# Patient Record
Sex: MALE | Race: WHITE | NOT HISPANIC OR LATINO | Employment: FULL TIME | URBAN - METROPOLITAN AREA
[De-identification: names, ages, dates, MRNs, and addresses within clinical notes are randomized per-mention and may not be internally consistent; named-entity substitution may affect disease eponyms.]

---

## 2020-08-25 ENCOUNTER — HOSPITAL ENCOUNTER (OUTPATIENT)
Dept: OTHER | Facility: HOSPITAL | Age: 60
Discharge: HOME | End: 2020-08-25
Attending: EMERGENCY MEDICINE

## 2021-01-08 ENCOUNTER — OFFICE VISIT CONVERTED (OUTPATIENT)
Dept: SURGERY | Facility: CLINIC | Age: 61
End: 2021-01-08
Attending: NURSE PRACTITIONER

## 2021-01-08 ENCOUNTER — CONVERSION ENCOUNTER (OUTPATIENT)
Dept: SURGERY | Facility: CLINIC | Age: 61
End: 2021-01-08

## 2021-04-02 ENCOUNTER — HOSPITAL ENCOUNTER (OUTPATIENT)
Dept: GASTROENTEROLOGY | Facility: HOSPITAL | Age: 61
Setting detail: HOSPITAL OUTPATIENT SURGERY
Discharge: HOME | End: 2021-04-02
Attending: SURGERY

## 2021-04-26 ENCOUNTER — CONVERSION ENCOUNTER (OUTPATIENT)
Dept: SURGERY | Facility: CLINIC | Age: 61
End: 2021-04-26

## 2021-04-26 ENCOUNTER — OFFICE VISIT CONVERTED (OUTPATIENT)
Dept: SURGERY | Facility: CLINIC | Age: 61
End: 2021-04-26
Attending: NURSE PRACTITIONER

## 2021-05-10 NOTE — H&P
History and Physical      Patient Name: Basilio Valencia   Patient ID: [id number]   Sex: Male   YOB: 1960    Primary Care Provider: Dexter Tapia MD    Visit Date: January 8, 2021    Provider: CLAY Darling   Location: [facility]   Location Address: [address] [place] [address]  [id number]   Location Phone: [phone]          Chief Complaint  · Requesting colonoscopy  · Age 50 or over  · Here today for a pre-surgical colon screening visit  · Personal History of Polyps      History Of Present Illness  The patient is a 60 year old /White male presenting to the Surgical Specialist office on a referral from Tj Darling MD.   Basilio Valencia needs to have a screening colonoscopy.   Patient states that they have had a colonoscopy. 5 years ago   Patient currently complains of: no complaints   Patient Does not have family history of colon cancer.      Patient presents today on referral from Dr. Tj Darling.  Patient presents today without complaints for screening colonoscopy.  Denies any abdominal pain, diarrhea, or rectal bleeding.  Denies family history of colorectal cancer.  Admits to history of colonic polyps.    12/15: Colonoscopy (Phong): Splenic flexure - hyperplastic; diverticulosis.     10/13: Colonoscopy (Phong): Cecum - tubular adenoma.     10/12: Colonoscopy (Phong): @10cm - Tubulovillous adenoma; Hepatic flexure - tubular/Tubulovillous adenoma; Cecum - tubular adenoma; Distal transverse - mixed adenomatous/hyperplastic; Splenic flexure - Tubulovillous adenoma.       Past Medical History  Disease Name Date Onset Notes   Arthritis --  --    Arthritis --  --    Cancer --  --    Osteoarthritis of right shoulder 09/11/2015 --    Personal history of colonic polyps 11/03/2015 --    Prostate Disorder --  --    Right Shoulder Osteoarthritis 08/26/2015 --    Screening for colon cancer 11/03/2015 --          Past Surgical History  Procedure Name Date Notes   Colonoscopy  --  --    Tonsillectomy --  --          Medication List  Name Date Started Instructions   Aleve oral  --          Allergy List  Allergen Name Date Reaction Notes   NO KNOWN DRUG ALLERGIES --  --  --        Allergies Reconciled  Family Medical History  Disease Name Relative/Age Notes   Cancer, Unspecified Mother/   Mother   Renal Cancer Mother/70s   Mother/70s   Family history of certain chronic disabling diseases; arthritis Mother/   Mother         Social History  Finding Status Start/Stop Quantity Notes   Alcohol Use Current some day --/-- --  drinks weekly, less than 1 drink per day, has been drinking for 31 or more years   lives with spouse --  --/-- --  --    . --  --/-- --  --    Recreational Drug Use Never --/-- --  no   Tobacco Former --/-- --  former smoker   Working --  --/-- --  --          Review of Systems  · Constitutional  o Denies  o : fever, chills  · Eyes  o Denies  o : yellowish discoloration of eyes  · HENT  o Denies  o : difficulty swallowing  · Cardiovascular  o Denies  o : chest pain, chest pain on exertion  · Respiratory  o Denies  o : shortness of breath  · Gastrointestinal  o Denies  o : nausea, vomiting, diarrhea, constipation  · Genitourinary  o Denies  o : abnormal color of urine  · Integument  o Denies  o : rash  · Neurologic  o Denies  o : tingling or numbness  · Musculoskeletal  o Denies  o : joint pain  · Endocrine  o Denies  o : weight gain, weight loss      Vitals  Date Time BP Position Site L\R Cuff Size HR RR TEMP (F) WT  HT  BMI kg/m2 BSA m2 O2 Sat FR L/min FiO2 HC       01/08/2021 10:03 AM       16  240lbs 0oz 6'   32.55 2.35             Physical Examination  · Constitutional  o Appearance  o : well developed, well-nourished, patient in no apparent distress  · Head and Face  o Head  o :   § Inspection  § : atraumatic, normocephalic  o Face  o :   § Inspection  § : no facial lesions  · Eyes  o Conjunctivae  o : conjunctivae normal  o Sclerae  o : sclerae  white  · Neck  o Inspection/Palpation  o : normal appearance, no masses or tenderness, trachea midline  · Respiratory  o Respiratory Effort  o : breathing unlabored  · Skin and Subcutaneous Tissue  o General Inspection  o : no lesions present, no areas of discoloration, skin turgor normal, texture normal  · Neurologic  o Mental Status Examination  o :   § Orientation  § : grossly oriented to person, place and time  § Attention  § : attention normal, concentration abilities normal  § Fund of Knowledge  § : fund of knowledge within normal limits, patient aware of current events  o Gait and Station  o : normal gait, able to stand without difficulty  · Psychiatric  o Judgement and Insight  o : judgment and insight intact  o Mood and Affect  o : mood normal, affect appropriate          Assessment  · Personal history of colonic polyps     V12.72/Z86.010  · Screening for colon cancer     V76.51/Z12.11    Problems Reconciled  Plan  · Orders  o Consent for Colonoscopy Screening-Possible risk/complications, benefits, and alternatives to surgical or invasive procedure have been explained to patient and/or legal guardian. -Patient has been evaluated and can tolerate anesthesia and/or sedation. Risks, benefits, and alternatives to anesthesia and sedation have been explained to patient and/or legal guardian. () - V76.51/Z12.11, V12.72/Z86.010 - 04/02/2021  · Medications  o Medications have been Reconciled  o Transition of Care or Provider Policy  · Instructions  o Surgical Facility: [facility]  o Handouts Provided Pre-Procedure Instructions including date, time, and location of procedure.   o PLAN: Proceeed with colonoscopy. Patient understands risks/benefits and is willing to proceed.   o ***Surgical Orders***  o RISK AND BENEFITS:  o Given these options, the patient has verbally expressed an understanding of the risks of the surgery and finds these risks acceptable. Will proceed with surgery as soon as  possible.  o O.R. PREP: Per protocol   o IV: Per Anesthesia  o Please sign permit for: Colonoscopy with possible biopsies by Dr. Isidro.  o The above History and Physical Examination has been completed within 30 days of admission.  o ***Patient Status***  o Outpatient  o Follow up in the in the office post procedure.  o Electronically Identified Patient Education Materials Provided Electronically  · Disposition  o Call or Return if symptoms worsen or persist.            Electronically Signed by: CLAY Darling -Author on January 8, 2021 10:52:29 AM

## 2021-05-11 NOTE — H&P
History and Physical      Patient Name: Basilio Valencia   Patient ID: [id number]   Sex: Male   YOB: 1960    Primary Care Provider: Dexter Tapia MD    Visit Date: April 26, 2021    Provider: CLAY Darling   Location: [facility]   Location Address: [address] [place] [address]  [id number]   Location Phone: [phone]          Chief Complaint  · Follow Up Colonoscopy      History Of Present Illness  Basilio Valencia is a 60 year old /White male who is here to follow up colonoscopy.      Patient presents today for follow-up visit after undergoing a colonoscopy on 4/2/2021 performed by Dr. Pedro Isidro.  Patient was with diverticulosis of the sigmoid colon and a tubular adenoma of the ascending colon per colonoscopy/pathology.  Patient denies any postoperative complications.       Past Medical History  Disease Name Date Onset Notes   Arthritis --  --    Arthritis --  --    Cancer --  --    Osteoarthritis of right shoulder 09/11/2015 --    Personal history of colonic polyps 11/03/2015 --    Prostate Disorder --  --    Right Shoulder Osteoarthritis 08/26/2015 --    Screening for colon cancer 11/03/2015 --          Past Surgical History  Procedure Name Date Notes   Colonoscopy --  --    Tonsillectomy --  --          Medication List  Name Date Started Instructions   Aleve oral  --          Allergy List  Allergen Name Date Reaction Notes   NO KNOWN DRUG ALLERGIES --  --  --        Allergies Reconciled  Family Medical History  Disease Name Relative/Age Notes   Cancer, Unspecified Mother/   Mother   Renal Cancer Mother/70s   Mother/70s   Family history of certain chronic disabling diseases; arthritis Mother/   Mother         Social History  Finding Status Start/Stop Quantity Notes   Alcohol Use Current some day --/-- --  drinks weekly, less than 1 drink per day, has been drinking for 31 or more years   lives with spouse --  --/-- --  --    . --  --/-- --  --     Recreational Drug Use Never --/-- --  no   Tobacco Former --/-- --  former smoker   Working --  --/-- --  --          Review of Systems  · Constitutional  o Denies  o : chills, fever  · Eyes  o Denies  o : yellowish discoloration of eyes  · HENT  o Denies  o : difficulty swallowing  · Cardiovascular  o Denies  o : chest pain on exertion  · Respiratory  o Denies  o : shortness of breath  · Gastrointestinal  o Denies  o : nausea, vomiting, diarrhea, constipation  · Genitourinary  o Denies  o : abnormal color of urine  · Integument  o Denies  o : rash  · Neurologic  o Denies  o : tingling or numbness  · Musculoskeletal  o Denies  o : joint pain  · Endocrine  o Denies  o : weight gain, weight loss      Vitals  Date Time BP Position Site L\R Cuff Size HR RR TEMP (F) WT  HT  BMI kg/m2 BSA m2 O2 Sat FR L/min FiO2 HC       04/26/2021 01:10 PM       14  235lbs 8oz 6'   31.94 2.33             Physical Examination  · Constitutional  o Appearance  o : well developed, well-nourished, patient in no apparent distress  · Head and Face  o Head  o :   § Inspection  § : atraumatic, normocephalic  o Face  o :   § Inspection  § : no facial lesions  · Eyes  o Conjunctivae  o : conjunctivae normal  o Sclerae  o : sclerae white  · Neck  o Inspection/Palpation  o : normal appearance, no masses or tenderness, trachea midline  · Respiratory  o Respiratory Effort  o : breathing unlabored  · Skin and Subcutaneous Tissue  o General Inspection  o : no lesions present, no areas of discoloration, skin turgor normal, texture normal  · Neurologic  o Mental Status Examination  o :   § Orientation  § : grossly oriented to person, place and time  § Attention  § : attention normal, concentration abilities normal  § Fund of Knowledge  § : fund of knowledge within normal limits, patient aware of current events  o Gait and Station  o : normal gait, able to stand without difficulty  · Psychiatric  o Judgement and Insight  o : judgment and insight  intact  o Mood and Affect  o : mood normal, affect appropriate              Assessment  · Diverticulosis     562.10/K57.90  · Tubular adenoma     229.9/D36.9  · S/P colonoscopy with polypectomy     V45.89/Z98.890    Problems Reconciled  Plan  · Medications  o Medications have been Reconciled  o Transition of Care or Provider Policy  · Instructions  o Per the AGA guidelines of 2012 patient is to follow up for colonoscopy surveillance  o Rescreen: In 5 years. Follow-up in the interim as needed  o increase fiber to 35 g/day  o Electronically Identified Patient Education Materials Provided Electronically  · Disposition  o EMR dragon/transcription disclaimer: Much of this encounter note is an electronic transcription/translation of spoken language to printed text. Electronic translation of spoken language may permit erroneous, or at times nonsensical words or phrases to be inadvertently trasncribed; although I have reviewed the note for such errors, some may still exist.            Electronically Signed by: CALY Darling -Author on April 26, 2021 02:00:43 PM

## 2021-05-14 VITALS — HEIGHT: 72 IN | BODY MASS INDEX: 32.51 KG/M2 | WEIGHT: 240 LBS | RESPIRATION RATE: 16 BRPM

## 2021-05-14 VITALS — BODY MASS INDEX: 31.9 KG/M2 | HEIGHT: 72 IN | RESPIRATION RATE: 14 BRPM | WEIGHT: 235.5 LBS

## 2022-11-14 ENCOUNTER — PREP FOR SURGERY (OUTPATIENT)
Dept: OTHER | Facility: HOSPITAL | Age: 62
End: 2022-11-14

## 2022-11-14 ENCOUNTER — OFFICE VISIT (OUTPATIENT)
Dept: ORTHOPEDIC SURGERY | Facility: CLINIC | Age: 62
End: 2022-11-14

## 2022-11-14 VITALS — WEIGHT: 235 LBS | HEIGHT: 72 IN | BODY MASS INDEX: 31.83 KG/M2

## 2022-11-14 DIAGNOSIS — S83.241A ACUTE MEDIAL MENISCUS TEAR OF RIGHT KNEE, INITIAL ENCOUNTER: Primary | ICD-10-CM

## 2022-11-14 PROBLEM — M23.321 MEDIAL MENISCUS, POSTERIOR HORN DERANGEMENT, RIGHT: Status: ACTIVE | Noted: 2022-11-14

## 2022-11-14 PROCEDURE — 99203 OFFICE O/P NEW LOW 30 MIN: CPT | Performed by: ORTHOPAEDIC SURGERY

## 2022-11-14 RX ORDER — CEFAZOLIN SODIUM 2 G/100ML
2 INJECTION, SOLUTION INTRAVENOUS ONCE
Status: CANCELLED | OUTPATIENT
Start: 2022-11-14 | End: 2022-11-14

## 2022-11-14 RX ORDER — CEFAZOLIN SODIUM IN 0.9 % NACL 3 G/100 ML
3 INTRAVENOUS SOLUTION, PIGGYBACK (ML) INTRAVENOUS ONCE
Status: CANCELLED | OUTPATIENT
Start: 2022-11-14 | End: 2022-11-14

## 2022-11-14 RX ORDER — FAMCICLOVIR 500 MG/1
500 TABLET ORAL 2 TIMES DAILY
COMMUNITY
Start: 2022-09-19 | End: 2022-11-29

## 2022-11-14 RX ORDER — TAMSULOSIN HYDROCHLORIDE 0.4 MG/1
CAPSULE ORAL
COMMUNITY
Start: 2022-10-19

## 2022-11-14 RX ORDER — DICLOFENAC SODIUM 75 MG/1
75 TABLET, DELAYED RELEASE ORAL EVERY 12 HOURS
COMMUNITY
Start: 2022-10-19 | End: 2022-11-29

## 2022-11-14 NOTE — PROGRESS NOTES
"Chief Complaint  Initial Evaluation of the Right Knee     Subjective      Basilio Valencia presents to [facility] for initial evaluation of the right knee.  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  Patient has had no injury noted. Patient having pain getting up and down and in and out of bed.     No Known Allergies     Social History     Socioeconomic History   • Marital status:    Tobacco Use   • Smoking status: Former     Packs/day: 1.00     Years: 15.00     Pack years: 15.00     Types: Cigarettes     Start date: 1977     Quit date: 1993     Years since quittin.8   • Smokeless tobacco: Current     Types: Chew   Substance and Sexual Activity   • Alcohol use: Yes     Alcohol/week: 6.0 standard drinks     Types: 6 Cans of beer per week   • Drug use: Never   • Sexual activity: Yes     Partners: Female     Birth control/protection: Vasectomy        Review of Systems     Objective   Vital Signs:   Ht 182.9 cm (72\")   Wt 107 kg (235 lb)   BMI 31.87 kg/m²       Physical Exam  Constitutional:       Appearance: Normal appearance. Patient is well-developed and normal weight.   HENT:      Head: Normocephalic.      Right Ear: Hearing and external ear normal.      Left Ear: Hearing and external ear normal.      Nose: Nose normal.   Eyes:      Conjunctiva/sclera: Conjunctivae normal.   Cardiovascular:      Rate and Rhythm: Normal rate.   Pulmonary:      Effort: Pulmonary effort is normal.      Breath sounds: No wheezing or rales.   Abdominal:      Palpations: Abdomen is soft.      Tenderness: There is no abdominal tenderness.   Musculoskeletal:      Cervical back: Normal range of motion.   Skin:     Findings: No rash.   Neurological:      Mental Status: Patient  is alert and oriented to person, place, and time.   Psychiatric:         Mood and Affect: Mood and affect normal.         Judgment: Judgment normal.       Ortho Exam      RIGHT KNEE Dorsal " pedal pulse 2+. Posterior tibialis pulse is 2+. Good tone of hip flexors, hip extensors, and hip abductors. Calf supple. Tear of medial meniscus. Swelling to left knee joint. Good strength to hamstrings, quadriceps, dorsiflexors, and plantar flexors. Arthritis behind the patella.     Procedures        Imaging Results (Most Recent)     None           Result Review :             Assessment and Plan     Diagnoses and all orders for this visit:    1. Acute medial meniscus tear of right knee, initial encounter (Primary)        Discussed the treatment plan with the patient. Discussed the treatment options with the patient, operative vs non-operative. The patient wants to proceed with scope.     Educated on risk of smoking. Discussed options for smoking cessation.  Discussed surgery., Risks/benefits discussed with patient including, but not limited to: infection, bleeding, neurovascular damage, re-rupture, aesthetic deformity, need for further surgery, and death. and Surgery pamphlet given.    Follow Up     Postoperatively       Patient was given instructions and counseling regarding his condition or for health maintenance advice. Please see specific information pulled into the AVS if appropriate.     Scribed for Clifford Bingham MD by Halle Dillard MA.  11/14/22   14:46 EST      I have personally performed the services described in this document as scribed by the above individual and it is both accurate and complete. Clifford Bingham MD 11/16/22

## 2022-11-16 ENCOUNTER — TELEPHONE (OUTPATIENT)
Dept: ORTHOPEDIC SURGERY | Facility: CLINIC | Age: 62
End: 2022-11-16

## 2022-11-16 ENCOUNTER — OFFICE VISIT (OUTPATIENT)
Dept: ORTHOPEDIC SURGERY | Facility: CLINIC | Age: 62
End: 2022-11-16

## 2022-11-16 VITALS — BODY MASS INDEX: 31.83 KG/M2 | WEIGHT: 235 LBS | HEART RATE: 80 BPM | HEIGHT: 72 IN | OXYGEN SATURATION: 97 %

## 2022-11-16 DIAGNOSIS — S83.241D ACUTE MEDIAL MENISCUS TEAR OF RIGHT KNEE, SUBSEQUENT ENCOUNTER: Primary | ICD-10-CM

## 2022-11-16 DIAGNOSIS — S83.241A ACUTE MEDIAL MENISCUS TEAR OF RIGHT KNEE, INITIAL ENCOUNTER: Primary | ICD-10-CM

## 2022-11-16 PROCEDURE — 99213 OFFICE O/P EST LOW 20 MIN: CPT | Performed by: ORTHOPAEDIC SURGERY

## 2022-11-16 NOTE — PROGRESS NOTES
"Chief Complaint  Follow-up of the Right Knee     Subjective      Basilio Valencia presents to [facility] for follow up of the right knee. 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  Patient has had no injury noted. Patient having pain getting up and down and in and out of bed. He is back today as his workers comp rep wanted him to come and see what his work status is.      No Known Allergies     Social History     Socioeconomic History   • Marital status:    Tobacco Use   • Smoking status: Former     Packs/day: 1.00     Years: 15.00     Pack years: 15.00     Types: Cigarettes     Start date: 1977     Quit date: 1993     Years since quittin.8   • Smokeless tobacco: Current     Types: Chew   Substance and Sexual Activity   • Alcohol use: Yes     Alcohol/week: 6.0 standard drinks     Types: 6 Cans of beer per week   • Drug use: Never   • Sexual activity: Yes     Partners: Female     Birth control/protection: Vasectomy        Review of Systems     Objective   Vital Signs:   Pulse 80   Ht 182.9 cm (72\")   Wt 107 kg (235 lb)   SpO2 97%   BMI 31.87 kg/m²       Physical Exam  Constitutional:       Appearance: Normal appearance. Patient is well-developed and normal weight.   HENT:      Head: Normocephalic.      Right Ear: Hearing and external ear normal.      Left Ear: Hearing and external ear normal.      Nose: Nose normal.   Eyes:      Conjunctiva/sclera: Conjunctivae normal.   Cardiovascular:      Rate and Rhythm: Normal rate.   Pulmonary:      Effort: Pulmonary effort is normal.      Breath sounds: No wheezing or rales.   Abdominal:      Palpations: Abdomen is soft.      Tenderness: There is no abdominal tenderness.   Musculoskeletal:      Cervical back: Normal range of motion.   Skin:     Findings: No rash.   Neurological:      Mental Status: Patient  is alert and oriented to person, place, and time.   Psychiatric:         Mood and Affect: Mood " and affect normal.         Judgment: Judgment normal.       Ortho Exam      RIGHT KNEE Dorsal pedal pulse 2+. Posterior tibialis pulse is 2+. Good tone of hip flexors, hip extensors, and hip abductors. Calf supple. Tear of medial meniscus. Swelling to left knee joint. Good strength to hamstrings, quadriceps, dorsiflexors, and plantar flexors. Arthritis behind the patella.       Procedures        Imaging Results (Most Recent)     None           Result Review :       MRI [facility] 11/1/22  1. Complex tearing throughout the posterior horn and body segment of the medial meniscus.  The body segment is partially extruded from the medial joint line.    2. Focal free margin tear of the body segment lateral meniscus.   3. Cruciate and collateral ligaments are intact.   4. Patellofemoral and medial compartment arthrosis, detailed above. Focal full-thickness chondral fissure of the central weightbearing lateral femoral condyle.  5. Moderate joint effusion with a 6.1 cm length popliteal cyst.  6. Mild anterior prepatellar subcutaneous soft tissue edema.  Non drainable soft tissue collections.      Assessment and Plan     Diagnoses and all orders for this visit:    1. Acute medial meniscus tear of right knee, subsequent encounter (Primary)        Discussed the treatment plan with the patient. Medial meniscus tear scope to be performed.  The patient given a note to be off work. Discussed activity modification to prevent further injury and decrease pain. This is a workers comp case.     Educated on risk of smoking. Discussed options for smoking cessation.  Call or return if worsening symptoms.    Follow Up     4 weeks.       Patient was given instructions and counseling regarding his condition or for health maintenance advice. Please see specific information pulled into the AVS if appropriate.     Scribed for Clifford Bingham MD by Halle Dillard MA.  11/16/22   13:19 EST      I have personally performed the services  described in this document as scribed by the above individual and it is both accurate and complete. Clifford Bingham MD 11/18/22

## 2022-11-18 RX ORDER — TRAMADOL HYDROCHLORIDE 50 MG/1
50 TABLET ORAL EVERY 8 HOURS PRN
Qty: 21 TABLET | Refills: 0 | Status: SHIPPED | OUTPATIENT
Start: 2022-11-18 | End: 2022-12-01 | Stop reason: HOSPADM

## 2022-11-21 ENCOUNTER — TELEPHONE (OUTPATIENT)
Dept: ORTHOPEDIC SURGERY | Facility: CLINIC | Age: 62
End: 2022-11-21

## 2022-11-21 NOTE — TELEPHONE ENCOUNTER
Caller: DARIO TORRES    Relationship: Emergency Contact    Best call back number: [phone]    What is the best time to reach you: YES    What was the call regarding: PT RECEIVED NOTIFICATIONS ON MY CHART REGARDING PRE-OP QUESTIONS FOR HIS SURGERY SCHEDULED FOR 12.1.2022. THEY CANNOT OPEN THE QUESTIONS TO COMPLETE.    Do you require a callback: YES

## 2022-11-21 NOTE — TELEPHONE ENCOUNTER
PATIENT CONTACTED AND I EXPLAINED TO HIM THAT OUR OFFICE DID NOT SEND THE NOTIFICATION, THAT IF HE IS UNABLE TO COMPLETE THE QUESTIONNAIRE ON THE PHILLIP THAT WHATEVER IS NEEDED CAN BE COMPLETED ON DAY OR SURGERY. PATIENT VOICED UNDERSTANDING.

## 2022-11-22 ENCOUNTER — TELEPHONE (OUTPATIENT)
Dept: ORTHOPEDICS | Facility: OTHER | Age: 62
End: 2022-11-22

## 2022-11-22 NOTE — TELEPHONE ENCOUNTER
Caller: BIJAN TORRES    Relationship: SELF    Best call back number: [phone]    What form or medical record are you requesting: NOTICE OF DESIGNATED PHYSICIAN FORM    Who is requesting this form or medical record from you: JORDYN    How would you like to receive the form or medical records (pick-up, mail, fax): PATIENT WILL PROVIDE FAX NUMBER AND DROP OFF FORM TOMORROW      Timeframe paperwork needed: 10 DAYS FROM 11/18/2022    Additional notes: THIS FORM NEEDS TO BE SIGNED BY DR. WARNER AND FAXED OVER

## 2022-12-01 ENCOUNTER — HOSPITAL ENCOUNTER (OUTPATIENT)
Facility: HOSPITAL | Age: 62
Discharge: HOME | End: 2022-12-01
Attending: ORTHOPAEDIC SURGERY | Admitting: ORTHOPAEDIC SURGERY

## 2022-12-01 ENCOUNTER — ANESTHESIA (OUTPATIENT)
Dept: PERIOP | Facility: HOSPITAL | Age: 62
End: 2022-12-01

## 2022-12-01 ENCOUNTER — ANESTHESIA EVENT (OUTPATIENT)
Dept: PERIOP | Facility: HOSPITAL | Age: 62
End: 2022-12-01

## 2022-12-01 VITALS
TEMPERATURE: 97 F | BODY MASS INDEX: 32.1 KG/M2 | HEIGHT: 72 IN | SYSTOLIC BLOOD PRESSURE: 122 MMHG | WEIGHT: 236.99 LBS | RESPIRATION RATE: 19 BRPM | DIASTOLIC BLOOD PRESSURE: 89 MMHG | OXYGEN SATURATION: 95 % | HEART RATE: 77 BPM

## 2022-12-01 DIAGNOSIS — S83.241D ACUTE MEDIAL MENISCUS TEAR OF RIGHT KNEE, SUBSEQUENT ENCOUNTER: Primary | ICD-10-CM

## 2022-12-01 DIAGNOSIS — S83.241A ACUTE MEDIAL MENISCUS TEAR OF RIGHT KNEE, INITIAL ENCOUNTER: ICD-10-CM

## 2022-12-01 PROCEDURE — 25010000002 DEXAMETHASONE PER 1 MG: Performed by: NURSE ANESTHETIST, CERTIFIED REGISTERED

## 2022-12-01 PROCEDURE — 25010000002 ONDANSETRON PER 1 MG: Performed by: NURSE ANESTHETIST, CERTIFIED REGISTERED

## 2022-12-01 PROCEDURE — 25010000002 MIDAZOLAM PER 1 MG: Performed by: ANESTHESIOLOGY

## 2022-12-01 PROCEDURE — 29881 ARTHRS KNE SRG MNISECTMY M/L: CPT | Performed by: ORTHOPAEDIC SURGERY

## 2022-12-01 PROCEDURE — 25010000002 PROPOFOL 10 MG/ML EMULSION: Performed by: NURSE ANESTHETIST, CERTIFIED REGISTERED

## 2022-12-01 PROCEDURE — 25010000002 FENTANYL CITRATE (PF) 50 MCG/ML SOLUTION: Performed by: NURSE ANESTHETIST, CERTIFIED REGISTERED

## 2022-12-01 PROCEDURE — 25010000002 CEFAZOLIN IN DEXTROSE 2-4 GM/100ML-% SOLUTION: Performed by: ORTHOPAEDIC SURGERY

## 2022-12-01 PROCEDURE — 25010000002 KETOROLAC TROMETHAMINE PER 15 MG: Performed by: NURSE ANESTHETIST, CERTIFIED REGISTERED

## 2022-12-01 PROCEDURE — S0260 H&P FOR SURGERY: HCPCS | Performed by: ORTHOPAEDIC SURGERY

## 2022-12-01 RX ORDER — ACETAMINOPHEN 500 MG
1000 TABLET ORAL ONCE
Status: COMPLETED | OUTPATIENT
Start: 2022-12-01 | End: 2022-12-01

## 2022-12-01 RX ORDER — KETOROLAC TROMETHAMINE 30 MG/ML
INJECTION, SOLUTION INTRAMUSCULAR; INTRAVENOUS AS NEEDED
Status: DISCONTINUED | OUTPATIENT
Start: 2022-12-01 | End: 2022-12-01 | Stop reason: SURG

## 2022-12-01 RX ORDER — SODIUM CHLORIDE, SODIUM LACTATE, POTASSIUM CHLORIDE, CALCIUM CHLORIDE 600; 310; 30; 20 MG/100ML; MG/100ML; MG/100ML; MG/100ML
9 INJECTION, SOLUTION INTRAVENOUS CONTINUOUS PRN
Status: DISCONTINUED | OUTPATIENT
Start: 2022-12-01 | End: 2022-12-01 | Stop reason: HOSPADM

## 2022-12-01 RX ORDER — OXYCODONE HYDROCHLORIDE 5 MG/1
5 TABLET ORAL
Status: DISCONTINUED | OUTPATIENT
Start: 2022-12-01 | End: 2022-12-01 | Stop reason: HOSPADM

## 2022-12-01 RX ORDER — HYDROCODONE BITARTRATE AND ACETAMINOPHEN 7.5; 325 MG/1; MG/1
1 TABLET ORAL EVERY 4 HOURS PRN
Qty: 20 TABLET | Refills: 0 | Status: SHIPPED | OUTPATIENT
Start: 2022-12-01

## 2022-12-01 RX ORDER — MEPERIDINE HYDROCHLORIDE 25 MG/ML
12.5 INJECTION INTRAMUSCULAR; INTRAVENOUS; SUBCUTANEOUS
Status: DISCONTINUED | OUTPATIENT
Start: 2022-12-01 | End: 2022-12-01 | Stop reason: HOSPADM

## 2022-12-01 RX ORDER — CEFAZOLIN SODIUM IN 0.9 % NACL 3 G/100 ML
3 INTRAVENOUS SOLUTION, PIGGYBACK (ML) INTRAVENOUS ONCE
Status: DISCONTINUED | OUTPATIENT
Start: 2022-12-01 | End: 2022-12-01

## 2022-12-01 RX ORDER — PROMETHAZINE HYDROCHLORIDE 25 MG/1
25 SUPPOSITORY RECTAL ONCE AS NEEDED
Status: DISCONTINUED | OUTPATIENT
Start: 2022-12-01 | End: 2022-12-01 | Stop reason: HOSPADM

## 2022-12-01 RX ORDER — PROMETHAZINE HYDROCHLORIDE 12.5 MG/1
25 TABLET ORAL ONCE AS NEEDED
Status: DISCONTINUED | OUTPATIENT
Start: 2022-12-01 | End: 2022-12-01 | Stop reason: HOSPADM

## 2022-12-01 RX ORDER — BUPIVACAINE HYDROCHLORIDE AND EPINEPHRINE 5; 5 MG/ML; UG/ML
INJECTION, SOLUTION EPIDURAL; INTRACAUDAL; PERINEURAL AS NEEDED
Status: DISCONTINUED | OUTPATIENT
Start: 2022-12-01 | End: 2022-12-01 | Stop reason: HOSPADM

## 2022-12-01 RX ORDER — MAGNESIUM HYDROXIDE 1200 MG/15ML
LIQUID ORAL AS NEEDED
Status: DISCONTINUED | OUTPATIENT
Start: 2022-12-01 | End: 2022-12-01 | Stop reason: HOSPADM

## 2022-12-01 RX ORDER — MIDAZOLAM HYDROCHLORIDE 1 MG/ML
2 INJECTION INTRAMUSCULAR; INTRAVENOUS ONCE
Status: COMPLETED | OUTPATIENT
Start: 2022-12-01 | End: 2022-12-01

## 2022-12-01 RX ORDER — LIDOCAINE HYDROCHLORIDE 20 MG/ML
INJECTION, SOLUTION EPIDURAL; INFILTRATION; INTRACAUDAL; PERINEURAL AS NEEDED
Status: DISCONTINUED | OUTPATIENT
Start: 2022-12-01 | End: 2022-12-01 | Stop reason: SURG

## 2022-12-01 RX ORDER — GLYCOPYRROLATE 0.2 MG/ML
INJECTION INTRAMUSCULAR; INTRAVENOUS AS NEEDED
Status: DISCONTINUED | OUTPATIENT
Start: 2022-12-01 | End: 2022-12-01 | Stop reason: SURG

## 2022-12-01 RX ORDER — ONDANSETRON 2 MG/ML
4 INJECTION INTRAMUSCULAR; INTRAVENOUS ONCE AS NEEDED
Status: DISCONTINUED | OUTPATIENT
Start: 2022-12-01 | End: 2022-12-01 | Stop reason: HOSPADM

## 2022-12-01 RX ORDER — DEXAMETHASONE SODIUM PHOSPHATE 4 MG/ML
INJECTION, SOLUTION INTRA-ARTICULAR; INTRALESIONAL; INTRAMUSCULAR; INTRAVENOUS; SOFT TISSUE AS NEEDED
Status: DISCONTINUED | OUTPATIENT
Start: 2022-12-01 | End: 2022-12-01 | Stop reason: SURG

## 2022-12-01 RX ORDER — ONDANSETRON 2 MG/ML
INJECTION INTRAMUSCULAR; INTRAVENOUS AS NEEDED
Status: DISCONTINUED | OUTPATIENT
Start: 2022-12-01 | End: 2022-12-01 | Stop reason: SURG

## 2022-12-01 RX ORDER — CEFAZOLIN SODIUM 2 G/100ML
2 INJECTION, SOLUTION INTRAVENOUS ONCE
Status: COMPLETED | OUTPATIENT
Start: 2022-12-01 | End: 2022-12-01

## 2022-12-01 RX ORDER — ROCURONIUM BROMIDE 10 MG/ML
INJECTION, SOLUTION INTRAVENOUS AS NEEDED
Status: DISCONTINUED | OUTPATIENT
Start: 2022-12-01 | End: 2022-12-01 | Stop reason: SURG

## 2022-12-01 RX ORDER — FENTANYL CITRATE 50 UG/ML
INJECTION, SOLUTION INTRAMUSCULAR; INTRAVENOUS AS NEEDED
Status: DISCONTINUED | OUTPATIENT
Start: 2022-12-01 | End: 2022-12-01 | Stop reason: SURG

## 2022-12-01 RX ORDER — PROPOFOL 10 MG/ML
VIAL (ML) INTRAVENOUS AS NEEDED
Status: DISCONTINUED | OUTPATIENT
Start: 2022-12-01 | End: 2022-12-01 | Stop reason: SURG

## 2022-12-01 RX ORDER — KETAMINE HCL IN NACL, ISO-OSM 100MG/10ML
SYRINGE (ML) INJECTION AS NEEDED
Status: DISCONTINUED | OUTPATIENT
Start: 2022-12-01 | End: 2022-12-01 | Stop reason: SURG

## 2022-12-01 RX ADMIN — CEFAZOLIN SODIUM 2 G: 2 INJECTION, SOLUTION INTRAVENOUS at 08:05

## 2022-12-01 RX ADMIN — PROPOFOL 200 MG: 10 INJECTION, EMULSION INTRAVENOUS at 07:57

## 2022-12-01 RX ADMIN — GLYCOPYRROLATE 0.2 MG: 0.2 INJECTION INTRAMUSCULAR; INTRAVENOUS at 08:00

## 2022-12-01 RX ADMIN — SUGAMMADEX 200 MG: 100 INJECTION, SOLUTION INTRAVENOUS at 08:31

## 2022-12-01 RX ADMIN — Medication 25 MG: at 08:09

## 2022-12-01 RX ADMIN — ACETAMINOPHEN 1000 MG: 500 TABLET ORAL at 07:20

## 2022-12-01 RX ADMIN — LIDOCAINE HYDROCHLORIDE 100 MG: 20 INJECTION, SOLUTION EPIDURAL; INFILTRATION; INTRACAUDAL; PERINEURAL at 07:57

## 2022-12-01 RX ADMIN — MIDAZOLAM HYDROCHLORIDE 2 MG: 1 INJECTION, SOLUTION INTRAMUSCULAR; INTRAVENOUS at 07:45

## 2022-12-01 RX ADMIN — ONDANSETRON 4 MG: 2 INJECTION INTRAMUSCULAR; INTRAVENOUS at 08:27

## 2022-12-01 RX ADMIN — SODIUM CHLORIDE, POTASSIUM CHLORIDE, SODIUM LACTATE AND CALCIUM CHLORIDE 9 ML/HR: 600; 310; 30; 20 INJECTION, SOLUTION INTRAVENOUS at 07:18

## 2022-12-01 RX ADMIN — DEXAMETHASONE SODIUM PHOSPHATE 4 MG: 4 INJECTION, SOLUTION INTRA-ARTICULAR; INTRALESIONAL; INTRAMUSCULAR; INTRAVENOUS; SOFT TISSUE at 08:00

## 2022-12-01 RX ADMIN — KETOROLAC TROMETHAMINE 30 MG: 30 INJECTION, SOLUTION INTRAMUSCULAR; INTRAVENOUS at 08:07

## 2022-12-01 RX ADMIN — ROCURONIUM BROMIDE 50 MG: 10 INJECTION INTRAVENOUS at 08:03

## 2022-12-01 NOTE — DISCHARGE INSTRUCTIONS
DISCHARGE INSTRUCTIONS  POST-OPERATIVE  Knee Arthroscopic Surgery      For your surgery you had:  General anesthesia (you may have a sore throat for the first 24 hours)  You received an anesthesia medication today that can cause hormonal forms of birth control to be ineffective. You should use a different form of birth control (to prevent pregnancy) for 7 days.   IV sedation.  Local anesthesia  Monitored anesthesia care  You may experience dizziness, drowsiness, or light-headedness for several hours following surgery/procedure.  Do not stay alone today or tonight.  Limit your activity for 24 hours.  Resume your diet slowly.  Follow whatever special dietary instructions you may have been given by your doctor.  You should not drive or operate machinery or drink alcohol for 24 hours or while you are taking pain medication.  You should not sign legally binding documents.    Post-Operative Day #1  Post-Operative Day #1 is counted as the 1st day after surgery.  Leave ace wrap on day one to aid in the reduction of swelling.  If dressing is too tight it can be rewrapped.  Post-Operative Day #2  Ace wrap and dressing may be removed.  Put a 4x4 dressing to suture or staple site.  Change dressing once or twice daily until suture or staples are removed.  It is normal to have some redness or irritation around skin sutures or stapes, however, if you have any expanding areas of redness with a persistent fever and increasing pain notify the physician as soon as possible.  On Post-Operative Day #2, you may want to reapply the ace wrap.  Put ace wrap directly over the knee to add compression and aid in swelling reduction.  It is normal to have some swelling down into the calf and ankle after knee arthroscopy or Anterior Cruciate Ligament (ACL) reconstruction.  If you notice a significant amount of swelling or increasing pain in calf area, notify the physician immediately.   Post-Operative Day #4  You may shower.  During shower,  avoid too much water to incision site.  Let water run down leg and then pat dry.  Do not put any ointments on the incision unless directed by surgeon.  Reapply dry dressing to sutures or staple sites.    General Information  Full weight bearing with crutches is allowed after a standard knee arthroscopy or ACL reconstruction unless instructed otherwise by surgeon.  You may put as much weight on knee as tolerable.  If given a knee immobilizer postoperatively, usually with an ACL reconstruction, this is for protection with early ambulation until you are steadier on your feet.  It is very important to take off immobilizer to do range of motion and flexion and extension exercises.  You do not have to sleep in the knee immobilizer.  Knee range of motion exercises should be started as early as the day of surgery.  Try to achieve full extension and start working on range of motion and flexion of the knee.  Move the ankle up and down periodically to help reduce swelling as well as reduce blood pooling.  To allow full extension of the knee and prevent blood clots it is very important to put pillows at the level of the mid-calf to the foot.  DO NOT put pillows directly behind knee.  SPECIAL INSTRUCTIONS:                                                             DISCHARGE INSTRUCTIONS  POST-OPERATIVE   Knee Arthroscopic Surgery      General Instructions  You will receive a prescription for physical therapy, unless otherwise instructed by physician.  Physical therapy is imperative especially after ACL reconstruction and some knee arthroscopies for swelling reduction, knee range of motion and strengthening.  .  Please refer to the instruction sheet given.   [x] Use ice pack on the knee for 20 minutes on and 20 minutes off.  Never place ice directly on the skin.  By following this, you will cool the knee sufficiently.  Avoid getting dressing wet.  To help reduce swelling and minimize throbbing pain, use pillows to keep the knee  elevated above the level of the heart, even while sleeping.  Sit with the leg propped up on a footstool or chair with pillows.  Exercises toes for 10 minutes every hour while awake.  If you are given a prescription for pain medication, take it as prescribed.  If you are able to take over-the-counter anti-inflammatory medications such as Motrin or Aleve, you may take as per package instructions in between the pain medication to help enhance pain control and reduce swelling.  If you are taking the pain medication prescribed, do not take Tylenol too unless you consult with the pharmacist. Generally, the pain medications prescribed have Tylenol in them and too much Tylenol can be harmful.  You should stop the anti-inflammatory medication if you experience any stomach/GI disturbances.  Consult with your physician or your pharmacist before taking over-the-counter pain medications/anti-inflammatories. It is not uncommon to have a fever post-operatively especially in the first 24-48 hours.  Deep breathing and coughing and early ambulation will help.  Anti-inflammatories can be used for fever reduction also.  If unable to urinate 6 to 8 hours after surgery or urinating frequently in small amounts, notify the physician or go to the nearest Emergency Room.  NOTIFY YOUR PHYSICIAN IF YOU EXPERIENCE:  Numbness of toes.  Inability to move toes.  Extreme coldness, paleness or blue dis-coloration of toes.  Any pain other than from the incision, such as swelling of the leg that blocks circulation of the toes.  Follow verbal instructions from your doctor.  Medications per physician instructions as indicated on Discharge Medication Information Sheet.  You should see  ______________________for follow-up care  on   .  Phone number: _________________________  Missing your appointment/follow-up could lead to serious complications.  If you have an emergency and cannot reach your doctor, go to an Emergency Room nearest your home.

## 2022-12-01 NOTE — ANESTHESIA PREPROCEDURE EVALUATION
Anesthesia Evaluation     Patient summary reviewed and Nursing notes reviewed   no history of anesthetic complications:  NPO Solid Status: > 8 hours  NPO Liquid Status: > 2 hours           Airway   Mallampati: II  TM distance: >3 FB  Neck ROM: full  No difficulty expected  Dental      Pulmonary - normal exam    breath sounds clear to auscultation  (+) a smoker Former,   Cardiovascular - negative cardio ROS and normal exam  Exercise tolerance: good (4-7 METS)    Rhythm: regular  Rate: normal        Neuro/Psych- negative ROS  GI/Hepatic/Renal/Endo - negative ROS     Musculoskeletal (-) negative ROS    Abdominal    Substance History - negative use     OB/GYN negative ob/gyn ROS         Other - negative ROS       ROS/Med Hx Other: PAT Nursing Notes unavailable.                   Anesthesia Plan    ASA 3     general       Anesthetic plan, risks, benefits, and alternatives have been provided, discussed and informed consent has been obtained with: patient and spouse/significant other.        CODE STATUS:

## 2022-12-01 NOTE — ANESTHESIA POSTPROCEDURE EVALUATION
Patient: Basilio Valencia    Procedure Summary     Date: 12/01/22 Room / Location: [facility] OR [address] [place] [address] OR [facility]    Anesthesia Start: 0752 Anesthesia Stop: 0842    Procedure: KNEE ARTHROSCOPY WITH PARTIAL MEDIAL MENISCECTOMY, CHONDROPLASTY of medial femoral condyl (Right) Diagnosis:       Acute medial meniscus tear of right knee, initial encounter      (Acute medial meniscus tear of right knee, initial encounter [S83.221A])    Surgeons: Clifford Bingham MD Provider: Jersey Grijalva MD    Anesthesia Type: general ASA Status: 3          Anesthesia Type: general    Vitals  Vitals Value Taken Time   /91 12/01/22 0911   Temp 36.1 °C (97 °F) 12/01/22 0840   Pulse 85 12/01/22 0911   Resp 19 12/01/22 0840   SpO2 95 % 12/01/22 0911   Vitals shown include unvalidated device data.        Post Anesthesia Care and Evaluation    Patient location during evaluation: bedside  Patient participation: complete - patient participated  Level of consciousness: awake  Pain management: adequate    Airway patency: patent  Anesthetic complications: No anesthetic complications  PONV Status: none  Cardiovascular status: acceptable and stable  Respiratory status: acceptable  Hydration status: acceptable    Comments: An Anesthesiologist personally participated in the most demanding procedures (including induction and emergence if applicable) in the anesthesia plan, monitored the course of anesthesia administration at frequent intervals and remained physically present and available for immediate diagnosis and treatment of emergencies.

## 2022-12-01 NOTE — OP NOTE
KNEE ARTHROSCOPY WITH MENISCECTOMY  Procedure Report    Patient Name:  Basilio Valencia  YOB: 1960    Date of Surgery:  12/1/2022     Indications: See H&P   pre-op Diagnosis:   Acute medial meniscus tear of right knee, initial encounter [S83.241A]   Right       Post-Op Diagnosis Codes:     * Acute medial meniscus tear of right knee, initial encounter [S83.241A]   Right  Procedure/CPT® Codes:      Procedure(s):  Right KNEE ARTHROSCOPY WITH PARTIAL MEDIAL MENISCECTOMY, CHONDROPLASTY of medial femoral condyle and patellofemoral joint staff:  Surgeon(s):  Clifford Bingham MD    Assistant: Aditi Peng    Anesthesia: General    Estimated Blood Loss: 5 mL    Implants:    Nothing was implanted during the procedure    Specimen:          None        Findings: See description    Complications: None    Description of Procedure: Surgical timeout was called. Operative extremity was correctly identified in the operating room suite. Patient was administered antibiotics per the SCIP protocol. The knee was examined under anesthesia. Lachman test was negative. Anterior and posterior drawer signs were negative. There was no medial or lateral instability. Pivot shift sign was negative. The leg was prepped and draped and applied an arthroscopic leg fitzgerald. The joint was examined in a systematic fashion. Arthroscopic portals were established. The patellofemoral joint was visualized. 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      The scope was then repositioned into the medial joint space. The intraoperative findings of the medial joint space included macerated tear of the posterior horn mid body medial meniscus was debrided with a basket and shaver.  There is evidence of grade 2 grade 3 changes chondromalacia medial femoral condyle and a chondroplasty was completed.  The ACL and PCL were taut and intact with probing. The  scope was then introduced into the lateral joint space. The intraoperative findings on the lateral joint space included pristine to probing.  The medial and lateral gutters were carefully inspected and some loose fragments of articular cartilage were identified and removed. Arthroscopic fluid was evacuated from the joint. The portals were carefully approximated. Intraarticular analgesic was injected for postoperative analgesia. Occlusive dressing was applied. Sponge, gauze and needle count was correct. No complications were encountered.  Patient was reversed from anesthetic and taken from the operating room to the recovery room in stable, satisfactory condition.       Clifford Bingham MD     Date: 12/1/2022  Time: 08:42 EST

## 2022-12-01 NOTE — H&P
"h and p      Chief Complaint  Follow-up of the Right Knee        Subjective          Basilio Valencia presents to [facility] for follow up of the right knee. 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  Patient has had no injury noted. Patient having pain getting up and down and in and out of bed. He is back today as his workers comp rep wanted him to come and see what his work status is.       No Known Allergies      Social History   Social History            Socioeconomic History   • Marital status:    Tobacco Use   • Smoking status: Former       Packs/day: 1.00       Years: 15.00       Pack years: 15.00       Types: Cigarettes       Start date: 1977       Quit date: 1993       Years since quittin.8   • Smokeless tobacco: Current       Types: Chew   Substance and Sexual Activity   • Alcohol use: Yes       Alcohol/week: 6.0 standard drinks       Types: 6 Cans of beer per week   • Drug use: Never   • Sexual activity: Yes       Partners: Female       Birth control/protection: Vasectomy            Review of Systems            Objective      Vital Signs:   Pulse 80   Ht 182.9 cm (72\")   Wt 107 kg (235 lb)   SpO2 97%   BMI 31.87 kg/m²        Physical Exam  Constitutional:       Appearance: Normal appearance. Patient is well-developed and normal weight.   HENT:      Head: Normocephalic.      Right Ear: Hearing and external ear normal.      Left Ear: Hearing and external ear normal.      Nose: Nose normal.   Eyes:      Conjunctiva/sclera: Conjunctivae normal.   Cardiovascular:      Rate and Rhythm: Normal rate.   Pulmonary:      Effort: Pulmonary effort is normal.      Breath sounds: No wheezing or rales.   Abdominal:      Palpations: Abdomen is soft.      Tenderness: There is no abdominal tenderness.   Musculoskeletal:      Cervical back: Normal range of motion.   Skin:     Findings: No rash.   Neurological:      Mental Status: Patient  is alert " and oriented to person, place, and time.   Psychiatric:         Mood and Affect: Mood and affect normal.         Judgment: Judgment normal.         Ortho Exam       RIGHT KNEE Dorsal pedal pulse 2+. Posterior tibialis pulse is 2+. Good tone of hip flexors, hip extensors, and hip abductors. Calf supple. Tear of medial meniscus. Swelling to left knee joint. Good strength to hamstrings, quadriceps, dorsiflexors, and plantar flexors. Arthritis behind the patella.         Procedures               Imaging Results (Most Recent)      None                   Result Review    :         MRI [facility] 11/1/22  1. Complex tearing throughout the posterior horn and body segment of the medial meniscus.  The body segment is partially extruded from the medial joint line.    2. Focal free margin tear of the body segment lateral meniscus.   3. Cruciate and collateral ligaments are intact.   4. Patellofemoral and medial compartment arthrosis, detailed above. Focal full-thickness chondral fissure of the central weightbearing lateral femoral condyle.  5. Moderate joint effusion with a 6.1 cm length popliteal cyst.  6. Mild anterior prepatellar subcutaneous soft tissue edema.  Non drainable soft tissue collections.         Assessment      Assessment and Plan      Diagnoses and all orders for this visit:     1. Acute medial meniscus tear of right knee, subsequent encounter (Primary)           Discussed the treatment planand risks and benefits  with the patient. Medial meniscus tear scope to be performed.  The patient given a note to be off work. Discussed activity modification to prevent further injury and decrease pain. This is a workers comp case.      Educated on risk of smoking. Discussed options for smoking cessation.  Call or return if worsening symptoms.     Follow Up    post op  Clifford Bingham MD  12/01/22

## 2022-12-14 ENCOUNTER — OFFICE VISIT (OUTPATIENT)
Dept: ORTHOPEDIC SURGERY | Facility: CLINIC | Age: 62
End: 2022-12-14

## 2022-12-14 VITALS — WEIGHT: 236 LBS | HEIGHT: 72 IN | BODY MASS INDEX: 31.97 KG/M2

## 2022-12-14 DIAGNOSIS — Z47.89 AFTERCARE FOLLOWING SURGERY OF THE MUSCULOSKELETAL SYSTEM: Primary | ICD-10-CM

## 2022-12-14 PROCEDURE — 99024 POSTOP FOLLOW-UP VISIT: CPT | Performed by: PHYSICIAN ASSISTANT

## 2022-12-14 NOTE — PATIENT INSTRUCTIONS
Sutures removed in office.  Patient educated on incision care.  Please keep incision clean and dry.  Do not soak or submerge in water until incision is fully healed.  Do not apply creams or lotions over the incision.      Continue icing as needed to help with pain and swelling.  Ice knee up to 3 or 4 times daily for no longer than 15 to 20 minutes at a time.    Continue PT to progress ROM and strength.    Follow-up in 4 weeks. Repeat x-rays not needed at this visit.  Please call with questions or concerns.

## 2022-12-14 NOTE — PROGRESS NOTES
"Chief Complaint  Follow-up and Pain of the Right Knee    Subjective      Basilio Valencia presents to [facility] for follow-up of right knee arthroscopy with partial medial meniscectomy, medial femoral chondroplasty, patellofemoral chondroplasty performed by Dr. Bingham on 12/1/2022.  Patient presents today independently ambulatory without use of assistive device.  Patient reports that he is doing \"better now than I was preoperatively\".  He is pleased with his postoperative recovery thus far.  He has started outpatient physical therapy at PT practices in [place].  PT reports right knee range of motion -2 degrees extension to 134 degrees of flexion.    Objective   No Known Allergies    Vital Signs:   Ht 182.9 cm (72\")   Wt 107 kg (236 lb)   BMI 32.01 kg/m²       Physical Exam    Constitutional: Awake, alert. Well nourished appearance.    Integumentary: Warm, dry, intact. No obvious rashes.    HENT: Atraumatic, normocephalic.   Respiratory: Non labored respirations .   Cardiovascular: Intact peripheral pulses.    Psychiatric: Normal mood and affect. A&O X3    Ortho Exam  Right knee: Sutures removed.  Mild edema.  Surgical incisions are visualized, which are clean, dry, and intact without any evidence of wound dehiscence, surrounding erythema, warmth, or drainage.  Patella is well tracking.  Knee is stable to varus and valgus stress.  -2 degrees of knee extension and knee flexion to 130 degrees.  Patient is fully weightbearing with nonantalgic gait.    Imaging Results (Most Recent)     None                  Assessment and Plan   Problem List Items Addressed This Visit    None  Visit Diagnoses     Aftercare following R knee arthroscopy with PMM, MFC, PFC    -  Primary        Follow Up   Return in about 4 weeks (around 1/11/2023).  Educated on risk of smoking. Discussed options for smoking cessation.    Patient Instructions   Sutures removed in office.  Patient educated on incision care.  " Please keep incision clean and dry.  Do not soak or submerge in water until incision is fully healed.  Do not apply creams or lotions over the incision.      Continue icing as needed to help with pain and swelling.  Ice knee up to 3 or 4 times daily for no longer than 15 to 20 minutes at a time.    Continue PT to progress ROM and strength.    Follow-up in 4 weeks. Repeat x-rays not needed at this visit.  Please call with questions or concerns.      Patient was given instructions and counseling regarding his condition or for health maintenance advice. Please see specific information pulled into the AVS if appropriate.

## 2023-01-18 ENCOUNTER — OFFICE VISIT (OUTPATIENT)
Dept: ORTHOPEDIC SURGERY | Facility: CLINIC | Age: 63
End: 2023-01-18
Payer: OTHER MISCELLANEOUS

## 2023-01-18 VITALS — BODY MASS INDEX: 31.97 KG/M2 | HEIGHT: 72 IN | WEIGHT: 236 LBS

## 2023-01-18 DIAGNOSIS — Z47.89 AFTERCARE FOLLOWING SURGERY OF THE MUSCULOSKELETAL SYSTEM: Primary | ICD-10-CM

## 2023-01-18 PROCEDURE — 99024 POSTOP FOLLOW-UP VISIT: CPT | Performed by: PHYSICIAN ASSISTANT

## 2023-01-18 NOTE — PATIENT INSTRUCTIONS
Patient is doing very well. Advised to continue home exercises to maintain strength and ROM.     Continue icing knee as needed up to 4 times daily for no longer than 15-20 mins at a time. Avoid deep squatting or pivoting on the knee.     Note provided for patient to return to fully duty work.     Follow-up as needed. Call with changes or concerns.

## 2023-01-18 NOTE — PROGRESS NOTES
"Chief Complaint  Pain and Follow-up of the Right Knee    Subjective      Basilio Valencia presents to [facility] for follow-up of right knee arthroscopy with partial medial meniscectomy, medial femoral chondroplasty, patellofemoral chondroplasty performed on 12/1/2022 by Dr. Bingham.  Patient presents today independently ambulatory without use of assistive device.  Reports that he is doing very well.  He has returned to walking and has continued home exercises.  He was discharged from outpatient physical therapy last week.  He reports he works as an , performs occasional heavy lifting and he feels ready to return to full work duties.    Objective   No Known Allergies    Vital Signs:   Ht 182.9 cm (72\")   Wt 107 kg (236 lb)   BMI 32.01 kg/m²       Physical Exam    Constitutional: Awake, alert. Well nourished appearance.    Integumentary: Warm, dry, intact. No obvious rashes.    HENT: Atraumatic, normocephalic.   Respiratory: Non labored respirations .   Cardiovascular: Intact peripheral pulses.    Psychiatric: Normal mood and affect. A&O X3    Ortho Exam  Right knee: Skin is warm, dry, and intact.  Well-healed surgical scars noted.  Minimal edema.  Patella is well tracking and is stable to varus and valgus stress.  Full knee extension and knee flexion to 130 degrees.  Full plantarflexion and dorsiflexion of the ankle.  Sensation intact light touch.  Distal neurovascular intact.  Smooth sit to stand transition.  Patient is fully weightbearing with nonantalgic gait.    Imaging Results (Most Recent)     None                    Assessment and Plan   Problem List Items Addressed This Visit    None  Visit Diagnoses     Aftercare following R knee arthroscopy with PMM, MFC, PFC    -  Primary          Follow Up   Return if symptoms worsen or fail to improve.  Patient is a former smoker.  Encourage continued tobacco cessation.  Did not discuss options for smoking " cessation.      Patient Instructions   Patient is doing very well. Advised to continue home exercises to maintain strength and ROM.     Continue icing knee as needed up to 4 times daily for no longer than 15-20 mins at a time. Avoid deep squatting or pivoting on the knee.     Note provided for patient to return to fully duty work.     Follow-up as needed. Call with changes or concerns.       Patient was given instructions and counseling regarding his condition or for health maintenance advice. Please see specific information pulled into the AVS if appropriate.

## 2023-02-09 ENCOUNTER — TELEPHONE (OUTPATIENT)
Dept: ORTHOPEDIC SURGERY | Facility: CLINIC | Age: 63
End: 2023-02-09
Payer: OTHER MISCELLANEOUS

## 2023-02-09 NOTE — TELEPHONE ENCOUNTER
CALLING AND REQ ALL NOTES AND ORDERS EVERYTHING FROM 12- AND 1- FAXED TO HER SO SHE CAN HANDLE THE CLAIM WANTS FAXED TO [phone]  ATTN MARCELINO

## 2023-05-19 ENCOUNTER — OFFICE VISIT (OUTPATIENT)
Dept: ORTHOPEDIC SURGERY | Facility: CLINIC | Age: 63
End: 2023-05-19
Payer: OTHER MISCELLANEOUS

## 2023-05-19 VITALS — BODY MASS INDEX: 31.97 KG/M2 | WEIGHT: 236 LBS | HEIGHT: 72 IN

## 2023-05-19 DIAGNOSIS — M25.561 RIGHT KNEE PAIN, UNSPECIFIED CHRONICITY: Primary | ICD-10-CM

## 2023-05-19 DIAGNOSIS — M17.11 OSTEOARTHRITIS OF RIGHT KNEE, UNSPECIFIED OSTEOARTHRITIS TYPE: ICD-10-CM

## 2023-05-19 RX ADMIN — METHYLPREDNISOLONE ACETATE 80 MG: 80 INJECTION, SUSPENSION INTRA-ARTICULAR; INTRALESIONAL; INTRAMUSCULAR; SOFT TISSUE at 16:12

## 2023-05-19 RX ADMIN — LIDOCAINE HYDROCHLORIDE 5 ML: 10 INJECTION, SOLUTION EPIDURAL; INFILTRATION; INTRACAUDAL; PERINEURAL at 16:12

## 2023-05-19 NOTE — PROGRESS NOTES
"Chief Complaint  Pain and Follow-up of the Right Knee     Subjective      Basilio Valencia presents to [facility] for follow-up of right knee arthroscopy with partial medial meniscectomy, medial femoral chondroplasty, patellofemoral chondroplasty performed on 2022  He has had knee osteo for years.  He is going to retire this summer.  He is working on weight loss.  He has had injections in the past along with physical therapy. .He is having recurrent swelling.  He had a cane in December and has no assistive device now. He has had progression of arthritis and we will try a steroid injection today.       No Known Allergies     Social History     Socioeconomic History   • Marital status:    Tobacco Use   • Smoking status: Former     Packs/day: 1.00     Years: 15.00     Pack years: 15.00     Types: Cigarettes     Start date: 1977     Quit date: 1993     Years since quittin.3   • Smokeless tobacco: Current     Types: Chew   Vaping Use   • Vaping Use: Never used   Substance and Sexual Activity   • Alcohol use: Yes     Alcohol/week: 6.0 standard drinks     Types: 6 Cans of beer per week   • Drug use: Never   • Sexual activity: Yes     Partners: Female     Birth control/protection: Vasectomy        Review of Systems     Objective   Vital Signs:   Ht 182.9 cm (72\")   Wt 107 kg (236 lb)   BMI 32.01 kg/m²       Physical Exam  Constitutional:       Appearance: Normal appearance. Patient is well-developed and normal weight.   HENT:      Head: Normocephalic.      Right Ear: Hearing and external ear normal.      Left Ear: Hearing and external ear normal.      Nose: Nose normal.   Eyes:      Conjunctiva/sclera: Conjunctivae normal.   Cardiovascular:      Rate and Rhythm: Normal rate.   Pulmonary:      Effort: Pulmonary effort is normal.      Breath sounds: No wheezing or rales.   Abdominal:      Palpations: Abdomen is soft.      Tenderness: There is no abdominal tenderness. "   Musculoskeletal:      Cervical back: Normal range of motion.   Skin:     Findings: No rash.   Neurological:      Mental Status: Patient  is alert and oriented to person, place, and time.   Psychiatric:         Mood and Affect: Mood and affect normal.         Judgment: Judgment normal.       Ortho Exam      RIGHT KNEE Flexion 120. Extension 0. Stable to varus/valgus stress. Stable to anterior/posterior drawer. Neurovascularly intact. Negative Evan. Negative Lachman. Positive EHL, FHL, HS and TA. Sensation intact to light touch all 5 nerves of the foot. Ambulates with Antalgic gait. Patella is well tracking. Calf supple, non-tender. Positive tenderness to the medial joint line. Positive tenderness to the lateral joint line. Positive Crepitus. Good strength to hamstrings, quadriceps, dorsiflexors, and plantar flexors.  Knee Extensor Mechanism intact          Large Joint Arthrocentesis: R knee  Date/Time: 5/19/2023 4:12 PM  Consent given by: patient  Site marked: site marked  Timeout: Immediately prior to procedure a time out was called to verify the correct patient, procedure, equipment, support staff and site/side marked as required   Supporting Documentation  Indications: pain   Procedure Details  Location: knee - R knee  Needle gauge: 21g.  Medications administered: 5 mL lidocaine PF 1% 1 %; 80 mg methylPREDNISolone acetate 80 MG/ML  Patient tolerance: patient tolerated the procedure well with no immediate complications              Imaging Results (Most Recent)     Procedure Component Value Units Date/Time    XR Knee 3 View Right [[id number]] Resulted: 05/19/23 1543     Updated: 05/19/23 1543           Result Review :     X-Ray Report:  Right knee X-Ray  Indication: Evaluation of the right knee  AP/Lateral and [place] view(s)  Findings: Advanced degenerative arthritis.   Prior studies available for comparison: yes             Assessment and Plan     Diagnoses and all orders for this visit:    1. Right knee  pain, unspecified chronicity (Primary)  -     XR Knee 3 View Right    2. Osteoarthritis of right knee, unspecified osteoarthritis type        Discussed the treatment plan with the patient. I reviewed the X-rays that were obtained today with the patient.     Discussed the risks and benefits of conservative measures.  The patient expressed understanding and wished to proceed with a right knee steroid injection .  He tolerated the injection well.         Call or return if worsening symptoms.    Follow Up     PRN      Patient was given instructions and counseling regarding his condition or for health maintenance advice. Please see specific information pulled into the AVS if appropriate.     Scribed for Clifford Bingham MD by Halle Dillard MA.  05/19/23   15:43 EDT    I have personally performed the services described in this document as scribed by the above individual and it is both accurate and complete. Clifford Bingham MD 05/22/23

## 2023-05-22 RX ORDER — LIDOCAINE HYDROCHLORIDE 10 MG/ML
5 INJECTION, SOLUTION EPIDURAL; INFILTRATION; INTRACAUDAL; PERINEURAL
Status: COMPLETED | OUTPATIENT
Start: 2023-05-19 | End: 2023-05-19

## 2023-05-22 RX ORDER — METHYLPREDNISOLONE ACETATE 80 MG/ML
80 INJECTION, SUSPENSION INTRA-ARTICULAR; INTRALESIONAL; INTRAMUSCULAR; SOFT TISSUE
Status: COMPLETED | OUTPATIENT
Start: 2023-05-19 | End: 2023-05-19

## 2025-04-15 ENCOUNTER — TRANSCRIBE ORDERS (OUTPATIENT)
Dept: PHYSICAL THERAPY | Facility: CLINIC | Age: 65
End: 2025-04-15

## 2025-04-15 DIAGNOSIS — R26.9 ABNORMALITY OF GAIT: ICD-10-CM

## 2025-04-15 DIAGNOSIS — R29.898 WEAKNESS OF BOTH LEGS: Primary | ICD-10-CM

## (undated) DEVICE — BLD CUT FORMLA AGGR PLS 4.0MM

## (undated) DEVICE — KNEE ARTHROSCOPY-LF: Brand: MEDLINE INDUSTRIES, INC.

## (undated) DEVICE — BNDG ELAS MATRX V/CLS 6INX10YD LF

## (undated) DEVICE — APPL CHLORAPREP HI/LITE 26ML ORNG

## (undated) DEVICE — GOWN,REINFORCE,POLY,SIRUS,BREATH SLV,XLG: Brand: MEDLINE

## (undated) DEVICE — GLV SURG SENSICARE PI PF LF 7 GRN STRL

## (undated) DEVICE — FMS FLUID MANAGEMENT SYSTEM OUTFLOW TUBING WITHOUT ONE-WAY VALVE (FMS VUE OR FMS DUO PLUS): Brand: FMS

## (undated) DEVICE — SUT ETHLN 3-0 FS118IN 663H

## (undated) DEVICE — DRSNG PAD ABD 8X10IN STRL

## (undated) DEVICE — SOL IRR NACL 0.9PCT 3000ML

## (undated) DEVICE — FMS FLUID MANAGEMENT SYSTEM INFLOW TUBING (FMS VUE): Brand: FMS

## (undated) DEVICE — GLV SURG SENSICARE SLT PF LF 7 STRL

## (undated) DEVICE — GLV SURG ULTRAFREE MAX LTX PF 8

## (undated) DEVICE — STERILE POLYISOPRENE POWDER-FREE SURGICAL GLOVES: Brand: PROTEXIS

## (undated) DEVICE — DISPOSABLE TOURNIQUET CUFF SINGLE BLADDER, SINGLE PORT AND QUICK CONNECT CONNECTOR: Brand: COLOR CUFF

## (undated) DEVICE — DRSNG GZ PETROLTM XEROFORM CURAD 1X8IN STRL